# Patient Record
(demographics unavailable — no encounter records)

---

## 2017-04-08 NOTE — UC
Knee Pain HPI





- HPI Summary


HPI Summary: 





Patient has had chronic right hip and left knee pain. She states that her hip 

is due to five bulging discs and that this is not a new problem. In the morning

, her hip is stiff and painful when she wakes. After moving and walking, the 

stiffness gets better. Her left knee is not a new problem but has more recently 

worsened. Three days ago, her dog jumped on her leg, and she heard a "pop." She 

states that since then, her knee has been popping when she stands up. She 

complains of constant pain. When she sits, the pain shoots up her thigh. When 

she stands, the pain shoots down her leg. She has tried Ibuprofen, but cannot 

always take it because she says that it makes her sleepy and she works 

overnight shifts.





- History of Current Complaint


Chief Complaint: UCLowerExtremity


Stated Complaint: RIGHT HIP,LEFT KNEE PAIN


Time Seen by Provider: 04/08/17 11:33


Hx Obtained From: Patient


Hx Last Menstrual Period: November


Onset/Duration: Gradual Onset - Chronic pain that worsened over the last three 

days


Severity Initially: Moderate


Severity Currently: Severe


Pain Intensity: 10


Pain Scale Used: 0-10 Numeric


Character: Sharp, Aching, Stiffness


Aggravating Factor(s): Movement - also hurts at rest


Alleviating Factor(s): OTC Meds


Associated Signs And Symptoms: Positive: Swelling


Able to Bear Weight: Yes





- Allergies/Home Medications


Allergies/Adverse Reactions: 


 Allergies











Allergy/AdvReac Type Severity Reaction Status Date / Time


 


Cephalexin [From Keflex] Allergy Intermediate Rash, Verified 04/08/17 11:28





   throat  





   swelling  


 


Penicillins Allergy Intermediate rash, Verified 04/08/17 11:28





   throat  





   swelling  


 


Prednisone Allergy Intermediate Hallucinati Verified 04/08/17 11:28





   ons  


 


Bee Venom Allergy  Swelling Verified 04/08/17 11:28





   Of  





   Face,Lips,&  





   Throat  














PMH/Surg Hx/FS Hx/Imm Hx


Previously Healthy: Yes


Cardiovascular History Of: Reports: Hypertension


Respiratory History Of: Reports: Asthma





- Surgical History


Surgical History: Yes


Surgery Procedure, Year, and Place: s/p Trach [id number].  tubal ligation.  

hernia 1999





- Family History


Known Family History: Positive: Other - cancer, suicide in father





- Social History


Occupation: Employed Part-time


Lives: With Family


Alcohol Use: None


Substance Use Type: None


Smoking Status (MU): Former Smoker


When Did the Patient Quit Smoking/Using Tobacco: 25 years ago





- Immunization History


Most Recent Influenza Vaccination: Not the 2016/2017 Season


Most Recent Tetanus Shot: unknown





Review of Systems


Constitutional: Negative


Skin: Negative


Eyes: Negative


ENT: Negative


Respiratory: Negative


Cardiovascular: Negative


Gastrointestinal: Negative


Genitourinary: Negative


Motor: Negative


Neurovascular: Negative


Musculoskeletal: Edema, Other: - Pain in left knee and right hip


Neurological: Negative


Psychological: Negative


All Other Systems Reviewed And Are Negative: Yes





Physical Exam


Triage Information Reviewed: Yes


Appearance: Well-Appearing, No Pain Distress


Vital Signs: 


 Initial Vital Signs











Temp  98.1 F   04/08/17 11:21


 


Pulse  82   04/08/17 11:21


 


Resp  16   04/08/17 11:21


 


BP  148/86   04/08/17 11:21


 


Pulse Ox  100   04/08/17 11:21











Vital Signs Reviewed: Yes


Eye Exam: Normal


Eyes: Positive: Conjunctiva Clear


ENT Exam: Normal


ENT: Positive: Normal ENT inspection


Neck exam: Normal


Neck: Positive: Supple, Nontender


Respiratory Exam: Normal


Respiratory: Positive: Chest non-tender, Lungs clear, Normal breath sounds


Cardiovascular Exam: Normal


Cardiovascular: Positive: RRR, No Murmur


Musculoskeletal Exam: Other


Musculoskeletal: Positive: ROM Limited @ - left knee, Edema @ - left knee


Neurological Exam: Normal


Neurological: Positive: Alert, Muscle Tone Normal


Psychological Exam: Normal


Psychological: Positive: Age Appropriate Behavior


Skin Exam: Normal





Knee Pain Course/Dx





- Course


Course Of Treatment: The x-ray showed arthritic changes in the left knee. We 

will refer the patient to orthopedics. We have encouraged her to use Ibuprofen 

for the pain and swelling, and to use ice.





- Differential Dx/Diagnosis


Differential Diagnosis/HQI/PQRI: Internal Derangement Of Knee, Sprain, Strain


Provider Diagnoses: Acute on chronic L knee pain.  Chronic R hip pain.  

elevated blood pressure due to discomfort





Discharge





- Discharge Plan


Condition: Stable


Disposition: HOME


Patient Education Materials:  Arthritis (ED)


Print Language: ENGLISH


Forms:  *Work Release


Referrals: 


LULU Hawley [Primary Care Provider] - 


Amelia Sampson MD [Medical Doctor] - 


Additional Instructions: 


The x-ray showed arthritis in the left knee. We would like for you to follow-up 

with orthopedics. Continue using Ibuprofen for pain and swelling. You can also 

use ice for the swelling.

## 2017-04-08 NOTE — RAD
INDICATION: Left knee pain     



COMPARISON: None



TECHNIQUE: AP, lateral, tunnel, and sunrise views were obtained.



FINDINGS: There is minor medial joint space narrowing and minor patellofemoral

osteoarthritis. There is no acute bony change. There is no joint effusion.



IMPRESSION: MINOR DEGENERATIVE CHANGE. NO ACUTE FINDINGS.